# Patient Record
Sex: MALE | Race: BLACK OR AFRICAN AMERICAN | Employment: UNEMPLOYED | URBAN - METROPOLITAN AREA
[De-identification: names, ages, dates, MRNs, and addresses within clinical notes are randomized per-mention and may not be internally consistent; named-entity substitution may affect disease eponyms.]

---

## 2017-08-12 ENCOUNTER — OP HISTORICAL/CONVERTED ENCOUNTER (OUTPATIENT)
Dept: OTHER | Age: 42
End: 2017-08-12

## 2019-12-21 ENCOUNTER — ED HISTORICAL/CONVERTED ENCOUNTER (OUTPATIENT)
Dept: OTHER | Age: 44
End: 2019-12-21

## 2021-02-09 ENCOUNTER — HOSPITAL ENCOUNTER (INPATIENT)
Age: 46
LOS: 1 days | Discharge: LEFT BEFORE DISCHARGE | DRG: 291 | End: 2021-02-09
Attending: STUDENT IN AN ORGANIZED HEALTH CARE EDUCATION/TRAINING PROGRAM | Admitting: FAMILY MEDICINE

## 2021-02-09 ENCOUNTER — APPOINTMENT (OUTPATIENT)
Dept: NON INVASIVE DIAGNOSTICS | Age: 46
DRG: 291 | End: 2021-02-09
Attending: INTERNAL MEDICINE

## 2021-02-09 ENCOUNTER — APPOINTMENT (OUTPATIENT)
Dept: GENERAL RADIOLOGY | Age: 46
DRG: 291 | End: 2021-02-09
Attending: STUDENT IN AN ORGANIZED HEALTH CARE EDUCATION/TRAINING PROGRAM

## 2021-02-09 ENCOUNTER — APPOINTMENT (OUTPATIENT)
Dept: ULTRASOUND IMAGING | Age: 46
DRG: 291 | End: 2021-02-09
Attending: INTERNAL MEDICINE

## 2021-02-09 VITALS
RESPIRATION RATE: 16 BRPM | OXYGEN SATURATION: 97 % | TEMPERATURE: 98.8 F | HEART RATE: 71 BPM | DIASTOLIC BLOOD PRESSURE: 103 MMHG | SYSTOLIC BLOOD PRESSURE: 184 MMHG

## 2021-02-09 DIAGNOSIS — I50.9 ACUTE ON CHRONIC CONGESTIVE HEART FAILURE, UNSPECIFIED HEART FAILURE TYPE (HCC): ICD-10-CM

## 2021-02-09 DIAGNOSIS — I16.0 HYPERTENSIVE URGENCY: ICD-10-CM

## 2021-02-09 DIAGNOSIS — R06.02 SOB (SHORTNESS OF BREATH): Primary | ICD-10-CM

## 2021-02-09 PROBLEM — N17.9 AKI (ACUTE KIDNEY INJURY) (HCC): Status: ACTIVE | Noted: 2021-02-09

## 2021-02-09 LAB
ALBUMIN SERPL-MCNC: 3 G/DL (ref 3.5–5)
ALBUMIN/GLOB SERPL: 0.8 {RATIO} (ref 1.1–2.2)
ALP SERPL-CCNC: 63 U/L (ref 45–117)
ALT SERPL-CCNC: 16 U/L (ref 12–78)
AMMONIA PLAS-SCNC: 13 UMOL/L
ANION GAP SERPL CALC-SCNC: 7 MMOL/L (ref 5–15)
AST SERPL W P-5'-P-CCNC: 27 U/L (ref 15–37)
BASOPHILS # BLD: 0 K/UL (ref 0–0.1)
BASOPHILS NFR BLD: 0 % (ref 0–1)
BILIRUB SERPL-MCNC: 0.5 MG/DL (ref 0.2–1)
BNP SERPL-MCNC: ABNORMAL PG/ML
BUN SERPL-MCNC: 45 MG/DL (ref 6–20)
BUN/CREAT SERPL: 6 (ref 12–20)
CA-I BLD-MCNC: 8.2 MG/DL (ref 8.5–10.1)
CHLORIDE SERPL-SCNC: 108 MMOL/L (ref 97–108)
CO2 SERPL-SCNC: 27 MMOL/L (ref 21–32)
COVID-19 RAPID TEST, COVR: NOT DETECTED
CREAT SERPL-MCNC: 7.38 MG/DL (ref 0.7–1.3)
DIFFERENTIAL METHOD BLD: ABNORMAL
EOSINOPHIL # BLD: 0.2 K/UL (ref 0–0.4)
EOSINOPHIL NFR BLD: 2 % (ref 0–7)
ERYTHROCYTE [DISTWIDTH] IN BLOOD BY AUTOMATED COUNT: 13.4 % (ref 11.5–14.5)
GLOBULIN SER CALC-MCNC: 4 G/DL (ref 2–4)
GLUCOSE SERPL-MCNC: 117 MG/DL (ref 65–100)
HCT VFR BLD AUTO: 32.8 % (ref 36.6–50.3)
HGB BLD-MCNC: 10.2 G/DL (ref 12.1–17)
IMM GRANULOCYTES # BLD AUTO: 0 K/UL (ref 0–0.04)
IMM GRANULOCYTES NFR BLD AUTO: 0 % (ref 0–0.5)
LYMPHOCYTES # BLD: 0.8 K/UL (ref 0.8–3.5)
LYMPHOCYTES NFR BLD: 9 % (ref 12–49)
MCH RBC QN AUTO: 26.7 PG (ref 26–34)
MCHC RBC AUTO-ENTMCNC: 31.1 G/DL (ref 30–36.5)
MCV RBC AUTO: 85.9 FL (ref 80–99)
MONOCYTES # BLD: 0.5 K/UL (ref 0–1)
MONOCYTES NFR BLD: 6 % (ref 5–13)
NEUTS SEG # BLD: 7.1 K/UL (ref 1.8–8)
NEUTS SEG NFR BLD: 83 % (ref 32–75)
PLATELET # BLD AUTO: 130 K/UL (ref 150–400)
PMV BLD AUTO: 11.6 FL (ref 8.9–12.9)
POTASSIUM SERPL-SCNC: 3.4 MMOL/L (ref 3.5–5.1)
PROT SERPL-MCNC: 7 G/DL (ref 6.4–8.2)
RBC # BLD AUTO: 3.82 M/UL (ref 4.1–5.7)
SARS-COV-2, COV2: NORMAL
SODIUM SERPL-SCNC: 142 MMOL/L (ref 136–145)
SPECIMEN SOURCE: NORMAL
TROPONIN I SERPL-MCNC: 0.17 NG/ML
WBC # BLD AUTO: 8.5 K/UL (ref 4.1–11.1)

## 2021-02-09 PROCEDURE — 80053 COMPREHEN METABOLIC PANEL: CPT

## 2021-02-09 PROCEDURE — 85025 COMPLETE CBC W/AUTO DIFF WBC: CPT

## 2021-02-09 PROCEDURE — 74011250636 HC RX REV CODE- 250/636: Performed by: STUDENT IN AN ORGANIZED HEALTH CARE EDUCATION/TRAINING PROGRAM

## 2021-02-09 PROCEDURE — 84484 ASSAY OF TROPONIN QUANT: CPT

## 2021-02-09 PROCEDURE — 71045 X-RAY EXAM CHEST 1 VIEW: CPT

## 2021-02-09 PROCEDURE — 96374 THER/PROPH/DIAG INJ IV PUSH: CPT

## 2021-02-09 PROCEDURE — 74011250636 HC RX REV CODE- 250/636: Performed by: FAMILY MEDICINE

## 2021-02-09 PROCEDURE — 65270000029 HC RM PRIVATE

## 2021-02-09 PROCEDURE — 99284 EMERGENCY DEPT VISIT MOD MDM: CPT

## 2021-02-09 PROCEDURE — 74011250637 HC RX REV CODE- 250/637: Performed by: STUDENT IN AN ORGANIZED HEALTH CARE EDUCATION/TRAINING PROGRAM

## 2021-02-09 PROCEDURE — 93306 TTE W/DOPPLER COMPLETE: CPT

## 2021-02-09 PROCEDURE — 82140 ASSAY OF AMMONIA: CPT

## 2021-02-09 PROCEDURE — 74011250637 HC RX REV CODE- 250/637: Performed by: FAMILY MEDICINE

## 2021-02-09 PROCEDURE — 87635 SARS-COV-2 COVID-19 AMP PRB: CPT

## 2021-02-09 PROCEDURE — 36415 COLL VENOUS BLD VENIPUNCTURE: CPT

## 2021-02-09 PROCEDURE — 76770 US EXAM ABDO BACK WALL COMP: CPT

## 2021-02-09 PROCEDURE — 93005 ELECTROCARDIOGRAM TRACING: CPT

## 2021-02-09 PROCEDURE — 83880 ASSAY OF NATRIURETIC PEPTIDE: CPT

## 2021-02-09 RX ORDER — FUROSEMIDE 10 MG/ML
40 INJECTION INTRAMUSCULAR; INTRAVENOUS 3 TIMES DAILY
Status: DISCONTINUED | OUTPATIENT
Start: 2021-02-09 | End: 2021-02-09 | Stop reason: HOSPADM

## 2021-02-09 RX ORDER — TORSEMIDE 20 MG/1
20 TABLET ORAL 2 TIMES DAILY
COMMUNITY

## 2021-02-09 RX ORDER — FUROSEMIDE 10 MG/ML
40 INJECTION INTRAMUSCULAR; INTRAVENOUS
Status: COMPLETED | OUTPATIENT
Start: 2021-02-09 | End: 2021-02-09

## 2021-02-09 RX ORDER — HYDRALAZINE HYDROCHLORIDE 100 MG/1
100 TABLET, FILM COATED ORAL 3 TIMES DAILY
COMMUNITY

## 2021-02-09 RX ORDER — HYDRALAZINE HYDROCHLORIDE 20 MG/ML
20 INJECTION INTRAMUSCULAR; INTRAVENOUS
Status: DISCONTINUED | OUTPATIENT
Start: 2021-02-09 | End: 2021-02-09 | Stop reason: HOSPADM

## 2021-02-09 RX ORDER — AMLODIPINE BESYLATE 5 MG/1
10 TABLET ORAL DAILY
Status: DISCONTINUED | OUTPATIENT
Start: 2021-02-09 | End: 2021-02-09 | Stop reason: HOSPADM

## 2021-02-09 RX ORDER — HYDRALAZINE HYDROCHLORIDE 20 MG/ML
20 INJECTION INTRAMUSCULAR; INTRAVENOUS
Status: DISCONTINUED | OUTPATIENT
Start: 2021-02-09 | End: 2021-02-09

## 2021-02-09 RX ORDER — ACETAMINOPHEN 325 MG/1
650 TABLET ORAL
Status: DISCONTINUED | OUTPATIENT
Start: 2021-02-09 | End: 2021-02-09 | Stop reason: HOSPADM

## 2021-02-09 RX ORDER — ONDANSETRON 4 MG/1
4 TABLET, ORALLY DISINTEGRATING ORAL
Status: DISCONTINUED | OUTPATIENT
Start: 2021-02-09 | End: 2021-02-09 | Stop reason: HOSPADM

## 2021-02-09 RX ORDER — POLYETHYLENE GLYCOL 3350 17 G/17G
17 POWDER, FOR SOLUTION ORAL DAILY PRN
Status: DISCONTINUED | OUTPATIENT
Start: 2021-02-09 | End: 2021-02-09 | Stop reason: HOSPADM

## 2021-02-09 RX ORDER — CLONIDINE 0.1 MG/24H
1 PATCH, EXTENDED RELEASE TRANSDERMAL
Status: DISCONTINUED | OUTPATIENT
Start: 2021-02-09 | End: 2021-02-09

## 2021-02-09 RX ORDER — HYDRALAZINE HYDROCHLORIDE 25 MG/1
100 TABLET, FILM COATED ORAL 3 TIMES DAILY
Status: DISCONTINUED | OUTPATIENT
Start: 2021-02-09 | End: 2021-02-09 | Stop reason: HOSPADM

## 2021-02-09 RX ORDER — POTASSIUM CHLORIDE 1.5 G/1.77G
40 POWDER, FOR SOLUTION ORAL 2 TIMES DAILY WITH MEALS
Status: DISCONTINUED | OUTPATIENT
Start: 2021-02-09 | End: 2021-02-09 | Stop reason: HOSPADM

## 2021-02-09 RX ORDER — CARVEDILOL 12.5 MG/1
25 TABLET ORAL 2 TIMES DAILY WITH MEALS
Status: DISCONTINUED | OUTPATIENT
Start: 2021-02-09 | End: 2021-02-09 | Stop reason: HOSPADM

## 2021-02-09 RX ORDER — METOPROLOL TARTRATE 25 MG/1
25 TABLET, FILM COATED ORAL 2 TIMES DAILY
Status: DISCONTINUED | OUTPATIENT
Start: 2021-02-09 | End: 2021-02-09

## 2021-02-09 RX ORDER — TORSEMIDE 10 MG/1
20 TABLET ORAL 2 TIMES DAILY
Status: DISCONTINUED | OUTPATIENT
Start: 2021-02-09 | End: 2021-02-09

## 2021-02-09 RX ORDER — ONDANSETRON 2 MG/ML
4 INJECTION INTRAMUSCULAR; INTRAVENOUS
Status: DISCONTINUED | OUTPATIENT
Start: 2021-02-09 | End: 2021-02-09 | Stop reason: HOSPADM

## 2021-02-09 RX ORDER — ISOSORBIDE MONONITRATE 60 MG/1
60 TABLET, EXTENDED RELEASE ORAL DAILY
Status: DISCONTINUED | OUTPATIENT
Start: 2021-02-09 | End: 2021-02-09

## 2021-02-09 RX ORDER — ACETAMINOPHEN 650 MG/1
650 SUPPOSITORY RECTAL
Status: DISCONTINUED | OUTPATIENT
Start: 2021-02-09 | End: 2021-02-09 | Stop reason: HOSPADM

## 2021-02-09 RX ORDER — HYDRALAZINE HYDROCHLORIDE 25 MG/1
50 TABLET, FILM COATED ORAL 2 TIMES DAILY
Status: DISCONTINUED | OUTPATIENT
Start: 2021-02-09 | End: 2021-02-09

## 2021-02-09 RX ORDER — HYDRALAZINE HYDROCHLORIDE 20 MG/ML
20 INJECTION INTRAMUSCULAR; INTRAVENOUS ONCE
Status: DISCONTINUED | OUTPATIENT
Start: 2021-02-09 | End: 2021-02-09

## 2021-02-09 RX ORDER — HYDRALAZINE HYDROCHLORIDE 20 MG/ML
10 INJECTION INTRAMUSCULAR; INTRAVENOUS ONCE
Status: COMPLETED | OUTPATIENT
Start: 2021-02-09 | End: 2021-02-09

## 2021-02-09 RX ORDER — CARVEDILOL 25 MG/1
25 TABLET ORAL 2 TIMES DAILY WITH MEALS
COMMUNITY

## 2021-02-09 RX ORDER — ISOSORBIDE MONONITRATE 60 MG/1
60 TABLET, EXTENDED RELEASE ORAL DAILY
COMMUNITY

## 2021-02-09 RX ADMIN — HYDRALAZINE HYDROCHLORIDE 50 MG: 25 TABLET, FILM COATED ORAL at 09:22

## 2021-02-09 RX ADMIN — FUROSEMIDE 40 MG: 10 INJECTION, SOLUTION INTRAMUSCULAR; INTRAVENOUS at 03:47

## 2021-02-09 RX ADMIN — METOPROLOL TARTRATE 25 MG: 25 TABLET, FILM COATED ORAL at 09:22

## 2021-02-09 RX ADMIN — NITROGLYCERIN 1 INCH: 20 OINTMENT TOPICAL at 03:32

## 2021-02-09 RX ADMIN — AMLODIPINE BESYLATE 10 MG: 5 TABLET ORAL at 09:22

## 2021-02-09 RX ADMIN — HYDRALAZINE HYDROCHLORIDE 10 MG: 20 INJECTION INTRAMUSCULAR; INTRAVENOUS at 08:23

## 2021-02-09 NOTE — ED PROVIDER NOTES
EMERGENCY DEPARTMENT HISTORY AND PHYSICAL EXAM      Date: 2/9/2021  Patient Name: Juliet Cartagena    History of Presenting Illness     Chief Complaint   Patient presents with    Cough    Shortness of Breath       History Provided By: Patient    HPI: Juliet Cartagena, 39 y.o. male with a past medical history significant hypertension and Congestive heart failure, renal insufficiency presents to the ED with cc of shortness of breath. Patient states he awoke this evening acutely dyspneic, approximately 2 AM.  Denies any chest pain, denies any fevers or chills. Patient does complain of some cough, from yesterday. Patient states he is occasionally compliant with his medicines, is supposed to take torsemide for his edema, has not taken recently. Denies any orthopnea. There are no other complaints, changes, or physical findings at this time. PCP: No primary care provider on file. Current Outpatient Medications   Medication Sig Dispense Refill    isosorbide mononitrate ER (IMDUR) 60 mg CR tablet Take 60 mg by mouth daily.  carvediloL (Coreg) 25 mg tablet Take 25 mg by mouth two (2) times daily (with meals).  torsemide (DEMADEX) 20 mg tablet Take 20 mg by mouth two (2) times a day.  hydrALAZINE (APRESOLINE) 100 mg tablet Take 100 mg by mouth three (3) times daily. Past History     Past Medical History:  Past Medical History:   Diagnosis Date    CHF (congestive heart failure) ([facility] [address]     Hypertension        Past Surgical History:  History reviewed. No pertinent surgical history. Family History:  History reviewed. No pertinent family history. Social History:  Social History     Tobacco Use    Smoking status: Never Smoker    Smokeless tobacco: Never Used   Substance Use Topics    Alcohol use: Not Currently    Drug use: Not Currently       Allergies:  No Known Allergies      Review of Systems     Review of Systems   Constitutional: Negative for activity change, chills and fever.    HENT: Negative for congestion and sore throat. Eyes: Negative for photophobia and visual disturbance. Respiratory: Positive for cough, chest tightness and shortness of breath. Negative for apnea. Cardiovascular: Positive for leg swelling. Negative for chest pain and palpitations. Gastrointestinal: Negative for abdominal pain, blood in stool, nausea and vomiting. Genitourinary: Negative for dysuria. Musculoskeletal: Negative for arthralgias and back pain. Skin: Negative for color change. Neurological: Negative for dizziness, weakness, numbness and headaches. Physical Exam     Physical Exam  Vitals signs and nursing note reviewed. Constitutional:       Appearance: Normal appearance. He is normal weight. HENT:      Head: Normocephalic and atraumatic. Nose: Nose normal.      Mouth/Throat:      Mouth: Mucous membranes are moist.   Eyes:      Extraocular Movements: Extraocular movements intact. Pupils: Pupils are equal, round, and reactive to light. Neck:      Musculoskeletal: Normal range of motion and neck supple. Cardiovascular:      Rate and Rhythm: Normal rate and regular rhythm. Pulses: Normal pulses. Heart sounds: Normal heart sounds. Pulmonary:      Breath sounds: Examination of the right-middle field reveals rhonchi. Examination of the right-lower field reveals rhonchi. Rhonchi present. Abdominal:      General: Abdomen is flat. Bowel sounds are normal.      Palpations: Abdomen is soft. Musculoskeletal: Normal range of motion. General: No swelling or tenderness. Right lower leg: Edema present. Left lower leg: Edema present. Skin:     General: Skin is warm and dry. Capillary Refill: Capillary refill takes less than 2 seconds. Neurological:      General: No focal deficit present. Mental Status: He is alert and oriented to person, place, and time. Cranial Nerves: No cranial nerve deficit. Sensory: No sensory deficit. Motor: No weakness. Psychiatric:         Mood and Affect: Mood normal.         Behavior: Behavior normal.         Diagnostic Study Results     Labs -     Recent Results (from the past 12 hour(s))   CBC WITH AUTOMATED DIFF    Collection Time: 02/09/21  2:42 AM   Result Value Ref Range    WBC 8.5 4.1 - 11.1 K/uL    RBC 3.82 (L) 4.10 - 5.70 M/uL    HGB 10.2 (L) 12.1 - 17.0 g/dL    HCT 32.8 (L) 36.6 - 50.3 %    MCV 85.9 80.0 - 99.0 FL    MCH 26.7 26.0 - 34.0 PG    MCHC 31.1 30.0 - 36.5 g/dL    RDW 13.4 11.5 - 14.5 %    PLATELET 492 (L) 424 - 400 K/uL    MPV 11.6 8.9 - 12.9 FL    NEUTROPHILS 83 (H) 32 - 75 %    LYMPHOCYTES 9 (L) 12 - 49 %    MONOCYTES 6 5 - 13 %    EOSINOPHILS 2 0 - 7 %    BASOPHILS 0 0 - 1 %    IMMATURE GRANULOCYTES 0 0.0 - 0.5 %    ABS. NEUTROPHILS 7.1 1.8 - 8.0 K/UL    ABS. LYMPHOCYTES 0.8 0.8 - 3.5 K/UL    ABS. MONOCYTES 0.5 0.0 - 1.0 K/UL    ABS. EOSINOPHILS 0.2 0.0 - 0.4 K/UL    ABS. BASOPHILS 0.0 0.0 - 0.1 K/UL    ABS. IMM. GRANS. 0.0 0.00 - 0.04 K/UL    DF AUTOMATED     METABOLIC PANEL, COMPREHENSIVE    Collection Time: 02/09/21  2:42 AM   Result Value Ref Range    Sodium 142 136 - 145 mmol/L    Potassium 3.4 (L) 3.5 - 5.1 mmol/L    Chloride 108 97 - 108 mmol/L    CO2 27 21 - 32 mmol/L    Anion gap 7 5 - 15 mmol/L    Glucose 117 (H) 65 - 100 mg/dL    BUN 45 (H) 6 - 20 mg/dL    Creatinine 7.38 (H) 0.70 - 1.30 mg/dL    BUN/Creatinine ratio 6 (L) 12 - 20      GFR est AA 10 (L) >60 ml/min/1.73m2    GFR est non-AA 8 (L) >60 ml/min/1.73m2    Calcium 8.2 (L) 8.5 - 10.1 mg/dL    Bilirubin, total 0.5 0.2 - 1.0 mg/dL    AST (SGOT) 27 15 - 37 U/L    ALT (SGPT) 16 12 - 78 U/L    Alk.  phosphatase 63 45 - 117 U/L    Protein, total 7.0 6.4 - 8.2 g/dL    Albumin 3.0 (L) 3.5 - 5.0 g/dL    Globulin 4.0 2.0 - 4.0 g/dL    A-G Ratio 0.8 (L) 1.1 - 2.2     TROPONIN I    Collection Time: 02/09/21  2:42 AM   Result Value Ref Range    Troponin-I, Qt. 0.17 (H) <0.05 ng/mL   BNP    Collection Time: 02/09/21  2:42 AM Result Value Ref Range    NT pro-BNP 21,130 (H) <125 pg/mL       Radiologic Studies -   [unfilled]  CT Results  (Last 48 hours)    None        CXR Results  (Last 48 hours)               02/09/21 0255  XR CHEST SNGL V Final result    Impression:      Groundglass opacities in the lungs. Recommend correlation for an infectious   process including Covid-19 infection. Follow-up as clinically indicated. Narrative:  Chest one view       INDICATION: Shortness of breath       COMPARISON: 8/12/2017       FINDINGS: Heart size is upper normal. Groundglass opacities in the lungs. No   pleural effusions. No pneumothorax. No displaced fracture in the visualized bony   structures. Medical Decision Making and ED Course   I am the first provider for this patient. I reviewed the vital signs, available nursing notes, past medical history, past surgical history, family history and social history. Vital Signs-Reviewed the patient's vital signs. Patient Vitals for the past 12 hrs:   Temp Pulse Resp BP SpO2   02/09/21 0416  66 20 (!) 183/104 97 %   02/09/21 0351  72 22 (!) 198/111 95 %   02/09/21 0232 98.8 °F (37.1 °C) 83 20 (!) 241/134 97 %       EKG interpretation: (Preliminary)  Normal sinus rhythm 77, no ST elevations, T wave inversions in inferior leads    Records Reviewed: Nursing Notes    The patient presents with shortness of breath with a differential diagnosis of CHF exacerbation, pulmonary edema, ACS, pneumonia, pleural effusion      Provider Notes (Medical Decision Making):     MDM   40-year-old male, history of hypertension, CHF, presents to emergency department with acute onset shortness of breath while sleeping today. On arrival patient noted to be hypertensive 240/130, saturating 97%, in no distress, physical exam significant for left lower and middle lobe rhonchi, bilateral lower extremity pitting edema 2+ to knees.     EKG shows no signs of acute MI, basic lab work drawn including cardiac enzymes BNP, chest x-ray ordered. Patient treated with Lasix 40 mg IV, 1 inch of Nitropaste. ED Course:   Initial assessment performed. The patients presenting problems have been discussed, and they are in agreement with the care plan formulated and outlined with them. I have encouraged them to ask questions as they arise throughout their visit. ED Course as of Feb 09 0607 Tue Feb 09, 2021   0329 TROPONIN I(!):    Troponin-I, Qt. 0.17(!) [PZ]   0329 BNP(!):    NT pro-BNP 21,130(!) [PZ]   2976 METABOLIC PANEL, COMPREHENSIVE(!):    Sodium 142   Potassium 3.4(!)   Chloride 108   CO2 27   Anion gap 7   Glucose 117(!)   BUN 45(!)   Creatinine 7.38(!)   BUN/Creatinine ratio 6(!)   GFR est AA 10(!)   GFR est non-AA 8(!)   Calcium 8.2(!)   Bilirubin, total 0.5   AST 27   ALT 16   Alk. phosphatase 63   Protein, total 7.0   Albumin 3.0(!)   Globulin 4.0   A-G Ratio 0.8(!) [PZ]   0352 Patient's lab work significant for elevated BNP at 21,000, troponin elevated 0.17. Patient has renal failure, BUN 45 creatinine 7.38. No old labs to compare to, potassium 3.4, bicarb 27, which makes me suspect that patient has chronic renal insufficiency. Patient states that he has been told that he has kidney dysfunction, not sure of level. Troponin may be falsely elevated due to renal sufficiency. As well as BNP. [PZ]   [id number] Patient reassessed after nitroglycerin and Lasix administered, patient states he feels improved shortness of breath, blood pressure improved 183/104, patient continues to saturate 97%. [PZ]   0603 Discussed case with Dr. Alma Bazan, will admit for CHF exacerbation.     [PZ]      ED Course User Index  [PZ] Tiera Hale MD         Procedures       Iraida Wasserman MD  Procedures               CRITICAL CARE NOTE :  3:44 AM  Amount of Critical Care Time: _30___(minutes)__    IMPENDING DETERIORATION -Cardiovascular  ASSOCIATED RISK FACTORS - Dysrhythmia and Vascular Compromise  MANAGEMENT- Bedside Assessment and Supervision of Care  INTERPRETATION -  Xrays and ECG  INTERVENTIONS - hemodynamic mngmt  CASE REVIEW - Hospitalist/Intensivist  TREATMENT RESPONSE -Improved  PERFORMED BY - Self    NOTES   :  I have spent critical care time involved in lab review, consultations with specialist, family decision- making, bedside attention and documentation. This time excludes time spent in any separate billed procedures. During this entire length of time I was immediately available to the patient . Joseph Salamanca MD        Disposition       Admitted        Diagnosis     Clinical Impression:   1. SOB (shortness of breath)    2. Acute on chronic congestive heart failure, unspecified heart failure type ([address]    3. Hypertensive urgency        Attestations:    Joseph Salamanca MD    Please note that this dictation was completed with [employer], the computer voice recognition software. Quite often unanticipated grammatical, syntax, homophones, and other interpretive errors are inadvertently transcribed by the computer software. Please disregard these errors. Please excuse any errors that have escaped final proofreading. Thank you.

## 2021-02-09 NOTE — CONSULTS
Consult    NAME: Ce De Los Santos   :  1975   MRN:  [id number]     Date/Time:  2021 12:02 PM    Patient PCP: None  ________________________________________________________________________      Subjective:   CHIEF COMPLAINT: Shortness of breath more pronounced since yesterday. HISTORY OF PRESENT ILLNESS: Patient has history of hypertension and chronic kidney disease and was on dialysis which was discontinued as his renal function had improved about 3 years ago. He developed shortness of breath which progressed and his BNP is elevated concerning of heart failure. He gives history of heart failure couple years ago. No symptoms of chest tightness or dizziness or lightheadedness. Past Medical History:   Diagnosis Date    [facility]     Hypertension    Patient has a chronic kidney disease and was on a dialysis but was discontinued due to improvement in renal function. History reviewed. No pertinent surgical history. No Known Allergies   Meds:  See below  Social History     Tobacco Use    Smoking status: Never Smoker    Smokeless tobacco: Never Used   Substance Use Topics    Alcohol use: Not Currently   Patient denies smoking, takes a social drink and no history of drug abuse. History reviewed. No pertinent family history. FAMILY HISTORY: Mother has hypertension and is 79years old and father had a high blood pressure and he  at the age of 61 from heart attack. PERSONAL HISTORY : Patient is  and has 3 children and works in the [employer].     REVIEW OF SYSTEMS:     []         Unable to obtain  ROS due to ---   [x]         Total of 12 systems reviewed as follows:    Constitutional: negative fever, negative chills, negative weight loss  Eyes:   negative visual changes  ENT:   negative sore throat, tongue or lip swelling  Respiratory:  negative cough, significant for dyspnea  Cards:  negative for chest pain, palpitations, lower extremity edema  GI:   negative for nausea, vomiting, diarrhea, and abdominal pain  Genitourinary: negative for frequency, dysuria  Integument:  negative for rash   Hematologic:  negative for easy bruising and gum/nose bleeding  Musculoskel: negative for myalgias,  back pain  Neurological:  negative for headaches, dizziness, vertigo, weakness  Behavl/Psych: negative for feelings of anxiety, depression     Pertinent Positives include :    Objective:      Physical Exam:    Last 24hrs VS reviewed since prior progress note. Most recent are:    Visit Vitals  BP (!) 220/118   Pulse 73   Temp 98.8 °F (37.1 °C)   Resp 18   SpO2 97%     No intake or output data in the 24 hours ending 02/09/21 1202     General Appearance: Well developed, well nourished, alert & oriented x 3,    no acute distress. Ears/Nose/Mouth/Throat: Pupils equal and round, Hearing grossly normal.  Neck: Supple. JVP within normal limits. Carotids good upstrokes, with no bruit. Chest: Lungs clear to auscultation bilaterally. Cardiovascular: Regular rate and rhythm, S1S2 normal, no murmur, rubs, gallops. Abdomen: Soft, non-tender, bowel sounds are active. No organomegaly. Extremities: No edema bilaterally. Femoral pulses +2, Distal Pulses +1. Skin: Warm and dry. Neuro: CN II-XII grossly intact, Strength and sensation grossly intact. []         Post-cath site without hematoma, bruit, tenderness, or thrill. Distal pulses intact. Data:      Telemetry:    EKG:  []  No new EKG for review. Prior to Admission medications    Medication Sig Start Date End Date Taking? Authorizing Provider   isosorbide mononitrate ER (IMDUR) 60 mg CR tablet Take 60 mg by mouth daily. Yes Other, MD Aicha   carvediloL (Coreg) 25 mg tablet Take 25 mg by mouth two (2) times daily (with meals). Yes Diamond, MD Aicha   torsemide (DEMADEX) 20 mg tablet Take 20 mg by mouth two (2) times a day.    Yes Diamond, MD Aicha   hydrALAZINE (APRESOLINE) 100 mg tablet Take 100 mg by mouth three (3) times daily. Yes Other, MD Aicha       Recent Results (from the past 24 hour(s))   CBC WITH AUTOMATED DIFF    Collection Time: 02/09/21  2:42 AM   Result Value Ref Range    WBC 8.5 4.1 - 11.1 K/uL    RBC 3.82 (L) 4.10 - 5.70 M/uL    HGB 10.2 (L) 12.1 - 17.0 g/dL    HCT 32.8 (L) 36.6 - 50.3 %    MCV 85.9 80.0 - 99.0 FL    MCH 26.7 26.0 - 34.0 PG    MCHC 31.1 30.0 - 36.5 g/dL    RDW 13.4 11.5 - 14.5 %    PLATELET 575 (L) 208 - 400 K/uL    MPV 11.6 8.9 - 12.9 FL    NEUTROPHILS 83 (H) 32 - 75 %    LYMPHOCYTES 9 (L) 12 - 49 %    MONOCYTES 6 5 - 13 %    EOSINOPHILS 2 0 - 7 %    BASOPHILS 0 0 - 1 %    IMMATURE GRANULOCYTES 0 0.0 - 0.5 %    ABS. NEUTROPHILS 7.1 1.8 - 8.0 K/UL    ABS. LYMPHOCYTES 0.8 0.8 - 3.5 K/UL    ABS. MONOCYTES 0.5 0.0 - 1.0 K/UL    ABS. EOSINOPHILS 0.2 0.0 - 0.4 K/UL    ABS. BASOPHILS 0.0 0.0 - 0.1 K/UL    ABS. IMM. GRANS. 0.0 0.00 - 0.04 K/UL    DF AUTOMATED     METABOLIC PANEL, COMPREHENSIVE    Collection Time: 02/09/21  2:42 AM   Result Value Ref Range    Sodium 142 136 - 145 mmol/L    Potassium 3.4 (L) 3.5 - 5.1 mmol/L    Chloride 108 97 - 108 mmol/L    CO2 27 21 - 32 mmol/L    Anion gap 7 5 - 15 mmol/L    Glucose 117 (H) 65 - 100 mg/dL    BUN 45 (H) 6 - 20 mg/dL    Creatinine 7.38 (H) 0.70 - 1.30 mg/dL    BUN/Creatinine ratio 6 (L) 12 - 20      GFR est AA 10 (L) >60 ml/min/1.73m2    GFR est non-AA 8 (L) >60 ml/min/1.73m2    Calcium 8.2 (L) 8.5 - 10.1 mg/dL    Bilirubin, total 0.5 0.2 - 1.0 mg/dL    AST (SGOT) 27 15 - 37 U/L    ALT (SGPT) 16 12 - 78 U/L    Alk.  phosphatase 63 45 - 117 U/L    Protein, total 7.0 6.4 - 8.2 g/dL    Albumin 3.0 (L) 3.5 - 5.0 g/dL    Globulin 4.0 2.0 - 4.0 g/dL    A-G Ratio 0.8 (L) 1.1 - 2.2     TROPONIN I    Collection Time: 02/09/21  2:42 AM   Result Value Ref Range    Troponin-I, Qt. 0.17 (H) <0.05 ng/mL   BNP    Collection Time: 02/09/21  2:42 AM   Result Value Ref Range    NT pro-BNP 21,130 (H) <125 pg/mL   SARS-COV-2    Collection Time: 02/09/21  5:45 AM   Result Value Ref Range    SARS-CoV-2 Please find results under separate order     COVID-19 RAPID TEST    Collection Time: 02/09/21  5:45 AM   Result Value Ref Range    Specimen source Nasopharyngeal      COVID-19 rapid test Not Detected Not Detected           Assessment:   His presentation is concerning for acute on chronic renal failure may have provoked secondary congestive heart failure because of volume overload status. Blood pressure is uncontrolled. Family history of coronary artery disease. 1.         Plan:   I will check echocardiogram.  Will adjust blood pressure medications. Will follow nephrology recommendations. Thank you.   1.       []        High complexity decision making was performed    Doug Flood MD

## 2021-02-09 NOTE — ED NOTES
Past Medical History:   Diagnosis Date    [facility]F (congestive heart failure) ([facility] [address]     Hypertension      History reviewed. No pertinent surgical history. Care assumed and bedside SBAR report endorsed on 39 y.o. male with a past medical history significant hypertension and Congestive heart failure, renal insufficiency presents to the ED with cc of shortness of breath. Patient states he awoke this evening acutely dyspneic, approximately 2 AM.  Denies any chest pain, denies any fevers or chills. Patient does complain of some cough, from yesterday. Patient states he is occasionally compliant with his medicines, is supposed to take torsemide for his edema, has not taken recently. Currently alert and oriented x3, pain currently within manageable limits, IV patent, plan of care reinforced, bed in lowest position, side rails up x2, call bell within reach, will continue to monitor.

## 2021-02-09 NOTE — ED NOTES
Spoke with Delbert Espana pt's mother who stated that she got in contact with pt and pt stated that he has left. Pt's mother stated that she encouraged pt to return. At present time pt has not returned nor has answered any phone calls .  Pt discharged as [facility]

## 2021-02-09 NOTE — H&P
History and Physical    NAME: Brooks Cueva   :  1975   MRN:  [id number]     Date/Time:  2021 10:47 AM    Patient PCP: None  ______________________________________________________________________             Subjective:     CHIEF COMPLAINT:     Shortness of breath    HISTORY OF PRESENT ILLNESS:       Patient is a 39y.o. year old male past medical history of chronic kidney disease hypertension chronic kidney disease came to emergency room because of shortness of breath patient was on dialysis 3 years ago when his kidney function improved and dialysis was stopped now kidney functions getting worse came to emergency room complaining of shortness of breath since yesterday patient denies any fever chills has some cough came to emergency room seen by the ER physician patient supposed to be on torsemide but not taking recently work-up shows elevated BUN/creatinine and elevated BNP suggestive of congestive heart failure acute on chronic kidney disease    Past Medical History:   Diagnosis Date    CHF (congestive heart failure) ([facility] [address]     Hypertension    Chronic kidney disease    History reviewed. No pertinent surgical history. Social History     Tobacco Use    Smoking status: Never Smoker    Smokeless tobacco: Never Used   Substance Use Topics    Alcohol use: Not Currently        History reviewed. No pertinent family history. No Known Allergies     Prior to Admission medications    Medication Sig Start Date End Date Taking? Authorizing Provider   isosorbide mononitrate ER (IMDUR) 60 mg CR tablet Take 60 mg by mouth daily. Yes Diamond, MD Aicha   carvediloL (Coreg) 25 mg tablet Take 25 mg by mouth two (2) times daily (with meals). Yes Aicha Fields MD   torsemide (DEMADEX) 20 mg tablet Take 20 mg by mouth two (2) times a day. Yes Aicha Fields MD   hydrALAZINE (APRESOLINE) 100 mg tablet Take 100 mg by mouth three (3) times daily.    Yes Aicha Fields MD         Current Facility-Administered Medications:   amLODIPine (NORVASC) tablet 10 mg, 10 mg, Oral, DAILY, Edwina Sims MD, 10 mg at 02/09/21 9959    isosorbide mononitrate ER (IMDUR) tablet 60 mg, 60 mg, Oral, DAILY, Diana Sims MD    carvediloL (COREG) tablet 25 mg, 25 mg, Oral, BID WITH MEALS, Diana Sims MD    hydrALAZINE (APRESOLINE) tablet 100 mg, 100 mg, Oral, TID, Diana Sims MD    acetaminophen (TYLENOL) tablet 650 mg, 650 mg, Oral, Q6H PRN **OR** acetaminophen (TYLENOL) suppository 650 mg, 650 mg, Rectal, Q6H PRN, Diana Sims MD    polyethylene glycol (MIRALAX) packet 17 g, 17 g, Oral, DAILY PRN, Diana Sims MD    ondansetron (ZOFRAN ODT) tablet 4 mg, 4 mg, Oral, Q8H PRN **OR** ondansetron (ZOFRAN) injection 4 mg, 4 mg, IntraVENous, Q6H PRN, Edwina Sims MD    hydrALAZINE (APRESOLINE) 20 mg/mL injection 20 mg, 20 mg, IntraVENous, Q6H PRN, Edwina Sims MD    furosemide (LASIX) injection 40 mg, 40 mg, IntraVENous, TID, Edwina Sims MD    Current Outpatient Medications:     isosorbide mononitrate ER (IMDUR) 60 mg CR tablet, Take 60 mg by mouth daily. , Disp: , Rfl:     carvediloL (Coreg) 25 mg tablet, Take 25 mg by mouth two (2) times daily (with meals). , Disp: , Rfl:     torsemide (DEMADEX) 20 mg tablet, Take 20 mg by mouth two (2) times a day., Disp: , Rfl:     hydrALAZINE (APRESOLINE) 100 mg tablet, Take 100 mg by mouth three (3) times daily. , Disp: , Rfl:     LAB DATA REVIEWED:    Recent Results (from the past 24 hour(s))   CBC WITH AUTOMATED DIFF    Collection Time: 02/09/21  2:42 AM   Result Value Ref Range    WBC 8.5 4.1 - 11.1 K/uL    RBC 3.82 (L) 4.10 - 5.70 M/uL    HGB 10.2 (L) 12.1 - 17.0 g/dL    HCT 32.8 (L) 36.6 - 50.3 %    MCV 85.9 80.0 - 99.0 FL    MCH 26.7 26.0 - 34.0 PG    MCHC 31.1 30.0 - 36.5 g/dL    RDW 13.4 11.5 - 14.5 %    PLATELET 777 (L) 677 - 400 K/uL    MPV 11.6 8.9 - 12.9 FL    NEUTROPHILS 83 (H) 32 - 75 %    LYMPHOCYTES 9 (L) 12 - 49 %    MONOCYTES 6 5 - 13 % EOSINOPHILS 2 0 - 7 %    BASOPHILS 0 0 - 1 %    IMMATURE GRANULOCYTES 0 0.0 - 0.5 %    ABS. NEUTROPHILS 7.1 1.8 - 8.0 K/UL    ABS. LYMPHOCYTES 0.8 0.8 - 3.5 K/UL    ABS. MONOCYTES 0.5 0.0 - 1.0 K/UL    ABS. EOSINOPHILS 0.2 0.0 - 0.4 K/UL    ABS. BASOPHILS 0.0 0.0 - 0.1 K/UL    ABS. IMM. GRANS. 0.0 0.00 - 0.04 K/UL    DF AUTOMATED     METABOLIC PANEL, COMPREHENSIVE    Collection Time: 02/09/21  2:42 AM   Result Value Ref Range    Sodium 142 136 - 145 mmol/L    Potassium 3.4 (L) 3.5 - 5.1 mmol/L    Chloride 108 97 - 108 mmol/L    CO2 27 21 - 32 mmol/L    Anion gap 7 5 - 15 mmol/L    Glucose 117 (H) 65 - 100 mg/dL    BUN 45 (H) 6 - 20 mg/dL    Creatinine 7.38 (H) 0.70 - 1.30 mg/dL    BUN/Creatinine ratio 6 (L) 12 - 20      GFR est AA 10 (L) >60 ml/min/1.73m2    GFR est non-AA 8 (L) >60 ml/min/1.73m2    Calcium 8.2 (L) 8.5 - 10.1 mg/dL    Bilirubin, total 0.5 0.2 - 1.0 mg/dL    AST (SGOT) 27 15 - 37 U/L    ALT (SGPT) 16 12 - 78 U/L    Alk. phosphatase 63 45 - 117 U/L    Protein, total 7.0 6.4 - 8.2 g/dL    Albumin 3.0 (L) 3.5 - 5.0 g/dL    Globulin 4.0 2.0 - 4.0 g/dL    A-G Ratio 0.8 (L) 1.1 - 2.2     TROPONIN I    Collection Time: 02/09/21  2:42 AM   Result Value Ref Range    Troponin-I, Qt. 0.17 (H) <0.05 ng/mL   BNP    Collection Time: 02/09/21  2:42 AM   Result Value Ref Range    NT pro-BNP 21,130 (H) <125 pg/mL   SARS-COV-2    Collection Time: 02/09/21  5:45 AM   Result Value Ref Range    SARS-CoV-2 Please find results under separate order     COVID-19 RAPID TEST    Collection Time: 02/09/21  5:45 AM   Result Value Ref Range    Specimen source Nasopharyngeal      COVID-19 rapid test Not Detected Not Detected         XR Results (most recent):  Results from Hospital Encounter encounter on 02/09/21   XR CHEST SNGL V    Narrative Chest one view    INDICATION: Shortness of breath    COMPARISON: 8/12/2017    FINDINGS: Heart size is upper normal. Groundglass opacities in the lungs. No  pleural effusions. No pneumothorax. No displaced fracture in the visualized bony  structures. Impression Groundglass opacities in the lungs. Recommend correlation for an infectious  process including Covid-19 infection. Follow-up as clinically indicated. XR CHEST SNGL V   Final Result      Groundglass opacities in the lungs. Recommend correlation for an infectious   process including Covid-19 infection. Follow-up as clinically indicated. Review of Systems:  Constitutional: Negative for chills and fever. HENT: Negative. Eyes: Negative. Respiratory: Shortness of breath   cardiovascular: Negative. Gastrointestinal: Negative for abdominal pain and nausea. Skin: Negative. Neurological: Negative. Objective:   VITALS:    Visit Vitals  BP (!) 220/118   Pulse 73   Temp 98.8 °F (37.1 °C)   Resp 18   SpO2 97%       Physical Exam:   Constitutional: pt is oriented to person, place, and time. HENT:   Head: Normocephalic and atraumatic. Eyes: Pupils are equal, round, and reactive to light. EOM are normal.   Cardiovascular: Normal rate, regular rhythm and normal heart sounds. Pulmonary/Chest: Decreased breath sounds   exhibits no tenderness. Abdominal: Soft. Bowel sounds are normal. There is no abdominal tenderness. There is no rebound and no guarding. Musculoskeletal: Normal range of motion. Neurological: pt is alert and oriented to person, place, and time. Alert. Normal strength. No cranial nerve deficit or sensory deficit. Displays a negative Romberg sign  Extremities 2+ edema.         ASSESSMENT & PLAN:    Acute congestive heart failure  Acute on chronic kidney disease  Hypertension emergency  History of end-stage renal disease on dialysis/not on dialysis for last 3 years  Mildly elevated troponin likely from kidney disease  Negative for COVID-19      Current Facility-Administered Medications:     amLODIPine (NORVASC) tablet 10 mg, 10 mg, Oral, DAILY, Edwina Sims MD, 10 mg at 02/09/21 3587   isosorbide mononitrate ER (IMDUR) tablet 60 mg, 60 mg, Oral, DAILY, Kenn Sims MD    carvediloL (COREG) tablet 25 mg, 25 mg, Oral, BID WITH MEALS, Kenn Sims MD    hydrALAZINE (APRESOLINE) tablet 100 mg, 100 mg, Oral, TID, Kenn Sims MD    acetaminophen (TYLENOL) tablet 650 mg, 650 mg, Oral, Q6H PRN **OR** acetaminophen (TYLENOL) suppository 650 mg, 650 mg, Rectal, Q6H PRN, Kenn Sims MD    polyethylene glycol (MIRALAX) packet 17 g, 17 g, Oral, DAILY PRN, Kenn Sims MD    ondansetron (ZOFRAN ODT) tablet 4 mg, 4 mg, Oral, Q8H PRN **OR** ondansetron (ZOFRAN) injection 4 mg, 4 mg, IntraVENous, Q6H PRN, Edwina Sims MD    hydrALAZINE (APRESOLINE) 20 mg/mL injection 20 mg, 20 mg, IntraVENous, Q6H PRN, Edwina Sims MD    furosemide (LASIX) injection 40 mg, 40 mg, IntraVENous, TID, Edwina Sims MD    Current Outpatient Medications:     isosorbide mononitrate ER (IMDUR) 60 mg CR tablet, Take 60 mg by mouth daily. , Disp: , Rfl:     carvediloL (Coreg) 25 mg tablet, Take 25 mg by mouth two (2) times daily (with meals). , Disp: , Rfl:     torsemide (DEMADEX) 20 mg tablet, Take 20 mg by mouth two (2) times a day., Disp: , Rfl:     hydrALAZINE (APRESOLINE) 100 mg tablet, Take 100 mg by mouth three (3) times daily. , Disp: , Rfl:       Nephrology consult   Cardiology consult    Start on Lasix 40 mg IV every  8 hours  Order 2D complete echo  Input and output  Monitor renal function    Dietitian consult  Strict LYUDMILA's    ________________________________________________________________________    Signed: Marcie Uribe MD

## 2021-02-09 NOTE — CONSULTS
Nephrology Consult    Patient: Angeline Farris MRN: [id number]  SSN: [id number]    YOB: 1975  Age: 39 y.o. Sex: male      Subjective: The pt is 40 yo man with hx of HT, non compliance, CKD (stage unknown), was been on dialysis and came off in the past, last labs done in 2018, no f/u to any PCP or nephrologist was admitted with sob, legs swelling, severely elevated BP, Cxray pulm edema and Cr 7.3. He is also with poor appetite. No N/V/D. He is on RA. Past Medical History:   Diagnosis Date    CHF (congestive heart failure) ([facility] [address]     Hypertension      History reviewed. No pertinent surgical history. History reviewed. No pertinent family history.   Social History     Tobacco Use    Smoking status: Never Smoker    Smokeless tobacco: Never Used   Substance Use Topics    Alcohol use: Not Currently      Current Facility-Administered Medications   Medication Dose Route Frequency Provider Last Rate Last Admin    amLODIPine (NORVASC) tablet 10 mg  10 mg Oral DAILY Edwina Sims MD   10 mg at 02/09/21 5978    carvediloL (COREG) tablet 25 mg  25 mg Oral BID WITH MEALS German Sims MD        hydrALAZINE (APRESOLINE) tablet 100 mg  100 mg Oral TID German Sims MD        acetaminophen (TYLENOL) tablet 650 mg  650 mg Oral Q6H PRN German Sims MD        Or   Blase Liming acetaminophen (TYLENOL) suppository 650 mg  650 mg Rectal Q6H PRN German Sims MD        polyethylene glycol (MIRALAX) packet 17 g  17 g Oral DAILY PRN German Sims MD        ondansetron (ZOFRAN ODT) tablet 4 mg  4 mg Oral Q8H PRN German Sims MD        Or    [facility]) injection 4 mg  4 mg IntraVENous Q6H PRN German Sims MD        furosemide (LASIX) injection 40 mg  40 mg IntraVENous TID Edwina Sims MD        hydrALAZINE (APRESOLINE) 20 mg/mL injection 20 mg  20 mg IntraVENous Q4H PRN Jadiel Garcia MD        potassium chloride (KLOR-CON) packet for solution 40 mEq  40 mEq Oral BID WITH MEALS Carrie Arango MD         Current Outpatient Medications   Medication Sig Dispense Refill    isosorbide mononitrate ER (IMDUR) 60 mg CR tablet Take 60 mg by mouth daily.  carvediloL (Coreg) 25 mg tablet Take 25 mg by mouth two (2) times daily (with meals).  torsemide (DEMADEX) 20 mg tablet Take 20 mg by mouth two (2) times a day.  hydrALAZINE (APRESOLINE) 100 mg tablet Take 100 mg by mouth three (3) times daily. No Known Allergies    Review of Systems:  A comprehensive review of systems was negative except for that written in the History of Present Illness.     Objective:     Vitals:    02/09/21 0416 02/09/21 0700 02/09/21 0823 02/09/21 0922   BP: (!) 183/104 (!) 229/137 (!) 213/125 (!) 220/118   Pulse: 66 73 75 73   Resp: 20 18     Temp:       SpO2: 97% 97%          Physical Exam:  General: NAD  Eyes: sclera anicteric  Oral Cavity: No thrush or ulcers  Neck: no JVD  Chest: Fair bilateral air entry  Heart: normal sounds  Abdomen: soft and non tender   : no shepherd  Lower Extremities: no edema  Skin: no rash  Neuro: intact  Psychiatric: non-depressed          Assessment:     Hospital Problems  Never Reviewed          Codes Class Noted POA    Shortness of breath ICD-10-CM: R06.02  ICD-9-CM: 786.05  2/9/2021 Unknown        CHF exacerbation ([facility] ICD-10-CM: I50.9  ICD-9-CM: 428.0  2/9/2021 Unknown        CRISELDA (acute kidney injury) ([facility] ICD-10-CM: N17.9  ICD-9-CM: 584.9  2/9/2021 Unknown              Plan:     1-CKD V:  -probably to ESRD now   -hx of CKD and required temporary dialysis in the past  -no labs or any physician f/u since 2018  -now Cr 7.3, volume overload, uremia  -will order Renal US  -will start on dialysis in AM  -IR to place perm cath   -CM to send [facility] to [facility]    2-SOB:  -LE edema  -from fluid overload  -Cxray pulm edema  -agreed with iv lasix  -Echo has been ordered    3-HTN:  -Malignant/accelerated  -on no meds since 2018 or been very non compliant  -was put on amlodipine 10/coreg 25 mg bid/hydralazine 100 mg tid  -will put on prn iv hydralazine    4-Hypokalemia:  -K 3.4  -po KCL 40 meq x 1    5-Anemia:  -Hb 10.2  -will check iron studies    6-Renal oste:  -Ca is ok  -will check phos/iPTH/Vit D    Thank you for the consultation.           Signed By: Nghia Bahena MD     February 9, 2021

## 2021-02-09 NOTE — ED NOTES
Upon rounding,pt was not present in room. Called IR and was told that pt was not there currently as he ws scheduled to go around 4 pm for an emergency permicath for HD. Called mother who stated that pt has been non-compliant and not taking his medications. Mother stated that she will try to get in contact with pt but, pt had not been communicating with her for a while and has not been listening to her regarding his health. Attempted to call pt on cell phone several times but call was forwarded to . Charge nurse and MD Sims made aware of pt's AMA and elopement.  All of pt's personal belongings taken

## 2021-02-09 NOTE — Clinical Note
Status[de-identified] INPATIENT [101]   Type of Bed: Remote Telemetry [29]   Inpatient Hospitalization Certified Necessary for the Following Reasons: 3.  Patient receiving treatment that can only be provided in an inpatient setting (further clarification in H&P documentation)   Admitting Diagnosis: CRISELDA (acute kidney injury) [facility]) [[id number]]   Admitting Physician: Valencia Santana [[id number]]   Attending Physician: Valencia Santana [[id number]]   Estimated Length of Stay: 2 Midnights   Discharge Plan[de-identified] Home with Office Follow-up

## 2021-02-10 LAB
ATRIAL RATE: 77 BPM
CALCULATED P AXIS, ECG09: 70 DEGREES
CALCULATED R AXIS, ECG10: 69 DEGREES
CALCULATED T AXIS, ECG11: -87 DEGREES
DIAGNOSIS, 93000: NORMAL
ECHO AO ROOT DIAM: 3.2 CM
ECHO AV PEAK GRADIENT: 8 MMHG
ECHO LA AREA 4C: 18.21 CM2
ECHO LA MAJOR AXIS: 4.4 CM
ECHO LA TO AORTIC ROOT RATIO: 1.38
ECHO LV E' SEPTAL VELOCITY: 5.56 CM/S
ECHO LV EJECTION FRACTION BIPLANE: 52.3 % (ref 55–100)
ECHO LV ESV A2C: 44.8 CM3
ECHO LV INTERNAL DIMENSION DIASTOLIC MMODE: 4.53 CM
ECHO LV INTERNAL DIMENSION SYSTOLIC MMODE: 3.32 CM
ECHO LV IVSD MMODE: 2.73 CM
ECHO LV POSTERIOR WALL DIASTOLIC MMODE: 2.73 CM
ECHO LVOT PEAK GRADIENT: 8 MMHG
ECHO LVOT SV: 56.4 CM3
ECHO MV A VELOCITY: 98.7 CM/S
ECHO MV AREA PHT: 1.25 CM2
ECHO MV E DECELERATION TIME (DT): 338 MS
ECHO MV E VELOCITY: 80.8 CM/S
ECHO MV E/A RATIO: 0.82
ECHO MV E/E' SEPTAL: 14.53
ECHO MV PRESSURE HALF TIME (PHT): 176 MS
ECHO PV PEAK INSTANTANEOUS GRADIENT SYSTOLIC: 3 MMHG
ECHO PV REGURGITANT MAX VELOCITY: 145 CM/S
ECHO PV REGURGITANT MAX VELOCITY: 145 CM/S
ECHO PV REGURGITANT MAX VELOCITY: 86 CM/S
ECHO RA AREA 4C: 11.41 CM2
ECHO RV INTERNAL DIMENSION: 2.77 CM
ECHO TV MAX VELOCITY: 245 CM/S
ECHO TV REGURGITANT PEAK GRADIENT: 24 MMHG
MV DEC SLOPE: 1580 MM/S2
MV DEC SLOPE: 1580 MM/S2
P-R INTERVAL, ECG05: 144 MS
Q-T INTERVAL, ECG07: 442 MS
QRS DURATION, ECG06: 86 MS
QTC CALCULATION (BEZET), ECG08: 500 MS
VENTRICULAR RATE, ECG03: 77 BPM

## 2022-03-19 PROBLEM — N17.9 AKI (ACUTE KIDNEY INJURY) (HCC): Status: ACTIVE | Noted: 2021-02-09

## 2022-03-19 PROBLEM — I50.9 CHF EXACERBATION (HCC): Status: ACTIVE | Noted: 2021-02-09

## 2022-03-20 PROBLEM — R06.02 SHORTNESS OF BREATH: Status: ACTIVE | Noted: 2021-02-09

## 2023-05-14 RX ORDER — HYDRALAZINE HYDROCHLORIDE 100 MG/1
100 TABLET, FILM COATED ORAL 3 TIMES DAILY
COMMUNITY

## 2023-05-14 RX ORDER — TORSEMIDE 20 MG/1
20 TABLET ORAL 2 TIMES DAILY
COMMUNITY

## 2023-05-14 RX ORDER — CARVEDILOL 25 MG/1
25 TABLET ORAL 2 TIMES DAILY WITH MEALS
COMMUNITY

## 2023-05-14 RX ORDER — ISOSORBIDE MONONITRATE 60 MG/1
60 TABLET, EXTENDED RELEASE ORAL DAILY
COMMUNITY

## 2024-09-29 ENCOUNTER — HOSPITAL ENCOUNTER (EMERGENCY)
Facility: HOSPITAL | Age: 49
Discharge: HOME | End: 2024-09-29
Attending: EMERGENCY MEDICINE
Payer: COMMERCIAL

## 2024-09-29 ENCOUNTER — APPOINTMENT (OUTPATIENT)
Facility: HOSPITAL | Age: 49
End: 2024-09-29
Payer: COMMERCIAL

## 2024-09-29 VITALS
WEIGHT: 166 LBS | TEMPERATURE: 98.2 F | OXYGEN SATURATION: 100 % | HEART RATE: 53 BPM | DIASTOLIC BLOOD PRESSURE: 113 MMHG | BODY MASS INDEX: 26.06 KG/M2 | RESPIRATION RATE: 13 BRPM | SYSTOLIC BLOOD PRESSURE: 180 MMHG | HEIGHT: 67 IN

## 2024-09-29 DIAGNOSIS — R55 SYNCOPE AND COLLAPSE: Primary | ICD-10-CM

## 2024-09-29 DIAGNOSIS — S09.90XA INJURY OF HEAD, INITIAL ENCOUNTER: ICD-10-CM

## 2024-09-29 LAB
ABO + RH BLD: NORMAL
ALBUMIN SERPL-MCNC: 3.6 G/DL (ref 3.5–5)
ALBUMIN/GLOB SERPL: 0.9 (ref 1.1–2.2)
ALP SERPL-CCNC: 52 U/L (ref 45–117)
ALT SERPL-CCNC: 34 U/L (ref 12–78)
AMPHET UR QL SCN: NEGATIVE
ANION GAP SERPL CALC-SCNC: 8 MMOL/L (ref 2–12)
APPEARANCE UR: CLEAR
AST SERPL W P-5'-P-CCNC: 17 U/L (ref 15–37)
BACTERIA URNS QL MICRO: NEGATIVE /HPF
BARBITURATES UR QL SCN: NEGATIVE
BENZODIAZ UR QL: NEGATIVE
BILIRUB SERPL-MCNC: 0.3 MG/DL (ref 0.2–1)
BILIRUB UR QL: NEGATIVE
BLOOD GROUP ANTIBODIES SERPL: NEGATIVE
BUN SERPL-MCNC: 101 MG/DL (ref 6–20)
BUN/CREAT SERPL: 16 (ref 12–20)
CA-I BLD-MCNC: 10.3 MG/DL (ref 8.5–10.1)
CANNABINOIDS UR QL SCN: NEGATIVE
CHLORIDE SERPL-SCNC: 107 MMOL/L (ref 97–108)
CO2 SERPL-SCNC: 23 MMOL/L (ref 21–32)
COCAINE UR QL SCN: NEGATIVE
COLOR UR: NORMAL
CREAT SERPL-MCNC: 6.13 MG/DL (ref 0.7–1.3)
ERYTHROCYTE [DISTWIDTH] IN BLOOD BY AUTOMATED COUNT: 11.7 % (ref 11.5–14.5)
ETHANOL SERPL-MCNC: <10 MG/DL (ref 0–0.08)
GLOBULIN SER CALC-MCNC: 3.8 G/DL (ref 2–4)
GLUCOSE SERPL-MCNC: 100 MG/DL (ref 65–100)
GLUCOSE UR STRIP.AUTO-MCNC: NEGATIVE MG/DL
HCT VFR BLD AUTO: 27.6 % (ref 36.6–50.3)
HGB BLD-MCNC: 8.8 G/DL (ref 12.1–17)
HGB UR QL STRIP: NEGATIVE
KETONES UR QL STRIP.AUTO: NEGATIVE MG/DL
LEUKOCYTE ESTERASE UR QL STRIP.AUTO: NEGATIVE
LIPASE SERPL-CCNC: 111 U/L (ref 13–75)
Lab: NORMAL
MCH RBC QN AUTO: 27.4 PG (ref 26–34)
MCHC RBC AUTO-ENTMCNC: 31.9 G/DL (ref 30–36.5)
MCV RBC AUTO: 86 FL (ref 80–99)
METHADONE UR QL: NEGATIVE
NITRITE UR QL STRIP.AUTO: NEGATIVE
NRBC # BLD: 0 K/UL (ref 0–0.01)
NRBC BLD-RTO: 0 PER 100 WBC
OPIATES UR QL: NEGATIVE
PCP UR QL: NEGATIVE
PH UR STRIP: 6 (ref 5–8)
PLATELET # BLD AUTO: 145 K/UL (ref 150–400)
PMV BLD AUTO: 11 FL (ref 8.9–12.9)
POTASSIUM SERPL-SCNC: 4.4 MMOL/L (ref 3.5–5.1)
PROT SERPL-MCNC: 7.4 G/DL (ref 6.4–8.2)
PROT UR STRIP-MCNC: NEGATIVE MG/DL
RBC # BLD AUTO: 3.21 M/UL (ref 4.1–5.7)
RBC #/AREA URNS HPF: NORMAL /HPF (ref 0–5)
SODIUM SERPL-SCNC: 138 MMOL/L (ref 136–145)
SP GR UR REFRACTOMETRY: 1.01 (ref 1–1.03)
SPECIMEN EXP DATE BLD: NORMAL
TROPONIN I SERPL HS-MCNC: 21 NG/L (ref 0–76)
TROPONIN I SERPL HS-MCNC: 21 NG/L (ref 0–76)
UROBILINOGEN UR QL STRIP.AUTO: 0.1 EU/DL (ref 0.1–1)
WBC # BLD AUTO: 5.1 K/UL (ref 4.1–11.1)
WBC URNS QL MICRO: NORMAL /HPF (ref 0–4)

## 2024-09-29 PROCEDURE — 70486 CT MAXILLOFACIAL W/O DYE: CPT

## 2024-09-29 PROCEDURE — 86850 RBC ANTIBODY SCREEN: CPT

## 2024-09-29 PROCEDURE — 84484 ASSAY OF TROPONIN QUANT: CPT

## 2024-09-29 PROCEDURE — 70450 CT HEAD/BRAIN W/O DYE: CPT

## 2024-09-29 PROCEDURE — 6370000000 HC RX 637 (ALT 250 FOR IP): Performed by: EMERGENCY MEDICINE

## 2024-09-29 PROCEDURE — 72125 CT NECK SPINE W/O DYE: CPT

## 2024-09-29 PROCEDURE — 82077 ASSAY SPEC XCP UR&BREATH IA: CPT

## 2024-09-29 PROCEDURE — 81001 URINALYSIS AUTO W/SCOPE: CPT

## 2024-09-29 PROCEDURE — 80307 DRUG TEST PRSMV CHEM ANLYZR: CPT

## 2024-09-29 PROCEDURE — 86901 BLOOD TYPING SEROLOGIC RH(D): CPT

## 2024-09-29 PROCEDURE — 6830039000 HC L3 TRAUMA ALERT

## 2024-09-29 PROCEDURE — 71250 CT THORAX DX C-: CPT

## 2024-09-29 PROCEDURE — 80053 COMPREHEN METABOLIC PANEL: CPT

## 2024-09-29 PROCEDURE — 99285 EMERGENCY DEPT VISIT HI MDM: CPT

## 2024-09-29 PROCEDURE — 85027 COMPLETE CBC AUTOMATED: CPT

## 2024-09-29 PROCEDURE — 36415 COLL VENOUS BLD VENIPUNCTURE: CPT

## 2024-09-29 PROCEDURE — 83690 ASSAY OF LIPASE: CPT

## 2024-09-29 PROCEDURE — 86900 BLOOD TYPING SEROLOGIC ABO: CPT

## 2024-09-29 PROCEDURE — 93005 ELECTROCARDIOGRAM TRACING: CPT | Performed by: EMERGENCY MEDICINE

## 2024-09-29 RX ORDER — ACETAMINOPHEN 500 MG
1000 TABLET ORAL
Status: COMPLETED | OUTPATIENT
Start: 2024-09-29 | End: 2024-09-29

## 2024-09-29 RX ADMIN — ACETAMINOPHEN 1000 MG: 500 TABLET ORAL at 14:47

## 2024-09-29 ASSESSMENT — PAIN SCALES - GENERAL
PAINLEVEL_OUTOF10: 10
PAINLEVEL_OUTOF10: 6
PAINLEVEL_OUTOF10: 10

## 2024-09-29 ASSESSMENT — LIFESTYLE VARIABLES
HOW OFTEN DO YOU HAVE A DRINK CONTAINING ALCOHOL: NEVER
HOW MANY STANDARD DRINKS CONTAINING ALCOHOL DO YOU HAVE ON A TYPICAL DAY: PATIENT DOES NOT DRINK

## 2024-09-29 ASSESSMENT — PAIN - FUNCTIONAL ASSESSMENT: PAIN_FUNCTIONAL_ASSESSMENT: 0-10

## 2024-09-29 NOTE — DISCHARGE INSTRUCTIONS
mg/dL   Urine Drug Screen    Collection Time: 09/29/24 11:46 AM   Result Value Ref Range    Amphetamine, Urine Negative Negative      Barbiturates, Urine Negative Negative      Benzodiazepines, Urine Negative Negative      Cocaine, Urine Negative Negative      Methadone, Urine Negative Negative      Opiates, Urine Negative Negative      Phencyclidine, Urine Negative Negative      THC, TH-Cannabinol, Urine Negative Negative      Comments:        This test is a screen for drugs of abuse in a medical setting only (i.e., they are unconfirmed results and as such must not be used for non-medical purposes, e.g.,employment testing, legal testing). Due to its inherent nature, false positive (FP) and false negative (FN) results may be obtained. Therefore, if necessary for medical care, recommend confirmation of positive findings by GC/MS.     Urinalysis with Microscopic    Collection Time: 09/29/24 11:46 AM   Result Value Ref Range    Color, UA Yellow/Straw      Appearance Clear Clear      Specific Gravity, UA 1.010 1.003 - 1.030      pH, Urine 6.0 5.0 - 8.0      Protein, UA Negative Negative mg/dL    Glucose, Ur Negative Negative mg/dL    Ketones, Urine Negative Negative mg/dL    Bilirubin, Urine Negative Negative      Blood, Urine Negative Negative      Urobilinogen, Urine 0.1 0.1 - 1.0 EU/dL    Nitrite, Urine Negative Negative      Leukocyte Esterase, Urine Negative Negative      BACTERIA, URINE Negative Negative /hpf    WBC, UA 0-4 0 - 4 /hpf    RBC, UA 0-5 0 - 5 /hpf   TYPE AND SCREEN    Collection Time: 09/29/24 11:46 AM   Result Value Ref Range    Crossmatch expiration date 10/02/2024,2359     ABO/Rh O Positive     Antibody Screen Negative    Troponin    Collection Time: 09/29/24 11:46 AM   Result Value Ref Range    Troponin, High Sensitivity 21 0 - 76 ng/L   Troponin    Collection Time: 09/29/24  1:25 PM   Result Value Ref Range    Troponin, High Sensitivity 21 0 - 76 ng/L       Radiologic Studies  CT HEAD WO CONTRAST

## 2024-09-29 NOTE — ED NOTES
MD made aware of BP at discharge. Pt escorted out with [employer] and hospital security in wheelchair.

## 2024-09-29 NOTE — ED TRIAGE NOTES
Pt arrives via EMS from [facility]. Per EMS, pt had a syncopal episode, fell down a few stairs with c/o neck pain, pt has peaked T waves . No blood thinners.  Pt reports that he fell down 24 stairs, c/o chest pain prior to incident, neck pain after fall.  Received 324 asa and 1g calcium in route   Recent diagnosis of renal failure, supposed to be on dialysis, pt reports facility refusing dialysis   Presents with C Collar   Trauma [employer] alerted [id number]

## 2024-09-29 NOTE — ED NOTES
Pt requesting to speak with writer. Writer into speak with patient. Pt asking about documentation that he is reporting medications are not being administered by [facility] and requesting pain meds for neck pain.  Pt made aware that writer will speak with medical staff at [facility] and explain need for medications and to follow up with nephrologist and PCP as well as writer will inform MD of pain   1434: MD aware of pain. See MAR.  1436: Writer spoke with RAFAEL Pace, [facility] medical staff member, provided update on patient condition, discussed need for scheduled medications, today's results, and need for follow up with nephrologist and PCP; also informed RN that patient will be medications with 1g of tylenol prior to departure. Questions answered. RAFAEL Pace did inform writer that medication were only prescribed in 7 day increment, but will make provider aware and see about getting daily orders placed for medications to treat HTN and etc.

## 2024-09-29 NOTE — ED NOTES
Pt back from CT with writer and guards at this time. Pt placed back onto continuous pulse ox, cardiac monitoring, and blood pressure monitoring. Warm blankets applied

## 2024-09-29 NOTE — ED PROVIDER NOTES
[facility] EMERGENCY DEPT  EMERGENCY DEPARTMENT HISTORY AND PHYSICAL EXAM      Date: 9/29/2024  Patient Name: Taran Solano  MRN: [id number]  Birthdate 1975  Date of evaluation: 9/29/2024  Provider: David Fonseca MD   Note Started: 12:09 PM EDT 9/29/24    HISTORY OF PRESENT ILLNESS     Chief Complaint   Patient presents with    Fall    Loss of Consciousness    Chest Pain       History Provided By: Patient and EMS    HPI: Taran Solano is a 49 y.o. male presents for evaluation of a syncopal episode and a fall.  Patient states he fell down 24 stairs.  Patient reports he is having some neck pain after the fall.  Patient states he was having chest pain prior to the incident but not at this time.  EMS states they evaluated patient and will concern for peaked T waves so gave him 1 g of calcium along with 324 mg aspirin and route.  EMS reports history of renal failure but does not get regular dialysis per the patient.  EMS placed c-collar and route.    PAST MEDICAL HISTORY   Past Medical History:  Past Medical History:   Diagnosis Date    CHF (congestive heart failure) (HCC)     Hypertension        Past Surgical History:  No past surgical history on file.    Family History:  No family history on file.    Social History:  Social History     Tobacco Use    Smoking status: Never    Smokeless tobacco: Never   Substance Use Topics    Alcohol use: Not Currently    Drug use: Not Currently       Allergies:  No Known Allergies    PCP: No primary care provider on file.    Current Meds:   No current facility-administered medications for this encounter.     Current Outpatient Medications   Medication Sig Dispense Refill    carvedilol (COREG) 25 MG tablet Take 1 tablet by mouth 2 times daily (with meals)      hydrALAZINE (APRESOLINE) 100 MG tablet Take 1 tablet by mouth 3 times daily      isosorbide mononitrate (IMDUR) 60 MG extended release tablet Take 1 tablet by mouth daily      torsemide (DEMADEX) 20 MG tablet Take 1 tablet

## 2024-09-30 LAB
EKG ATRIAL RATE: 76 BPM
EKG DIAGNOSIS: NORMAL
EKG P AXIS: 30 DEGREES
EKG P-R INTERVAL: 150 MS
EKG Q-T INTERVAL: 408 MS
EKG QRS DURATION: 88 MS
EKG QTC CALCULATION (BAZETT): 459 MS
EKG R AXIS: 10 DEGREES
EKG T AXIS: 9 DEGREES
EKG VENTRICULAR RATE: 76 BPM